# Patient Record
Sex: FEMALE | Race: WHITE | NOT HISPANIC OR LATINO | Employment: OTHER | URBAN - METROPOLITAN AREA
[De-identification: names, ages, dates, MRNs, and addresses within clinical notes are randomized per-mention and may not be internally consistent; named-entity substitution may affect disease eponyms.]

---

## 2017-03-28 ENCOUNTER — HOSPITAL ENCOUNTER (EMERGENCY)
Facility: HOSPITAL | Age: 42
Discharge: HOME | End: 2017-03-28
Attending: EMERGENCY MEDICINE | Admitting: EMERGENCY MEDICINE
Payer: MEDICARE

## 2017-03-28 VITALS
DIASTOLIC BLOOD PRESSURE: 99 MMHG | RESPIRATION RATE: 16 BRPM | OXYGEN SATURATION: 97 % | HEART RATE: 90 BPM | SYSTOLIC BLOOD PRESSURE: 168 MMHG | TEMPERATURE: 98.4 F

## 2017-03-28 DIAGNOSIS — G40.909 SEIZURE DISORDER (HCC): Primary | ICD-10-CM

## 2017-03-28 PROCEDURE — 99281 EMR DPT VST MAYX REQ PHY/QHP: CPT

## 2017-03-28 RX ORDER — ALPRAZOLAM 2 MG/1
2 TABLET ORAL
COMMUNITY
End: 2017-03-28

## 2017-03-28 RX ORDER — LAMOTRIGINE 5 MG/1
10 TABLET, CHEWABLE ORAL
COMMUNITY
End: 2017-03-28

## 2017-03-28 RX ORDER — BUPRENORPHINE AND NALOXONE 4; 1 MG/1; MG/1
FILM, SOLUBLE BUCCAL; SUBLINGUAL
COMMUNITY
End: 2017-03-28

## 2017-03-28 RX ORDER — LAMOTRIGINE 250 MG/1
1 TABLET, EXTENDED RELEASE ORAL DAILY
Qty: 30 TABLET | Refills: 0 | Status: SHIPPED | OUTPATIENT
Start: 2017-03-28 | End: 2017-03-28

## 2017-03-28 RX ORDER — LORAZEPAM 1 MG/1
1 TABLET ORAL DAILY
Qty: 10 TABLET | Refills: 0 | Status: SHIPPED | OUTPATIENT
Start: 2017-03-28 | End: 2017-04-07

## 2017-03-28 RX ORDER — CLONIDINE HYDROCHLORIDE 0.1 MG/1
0.1 TABLET ORAL
COMMUNITY
End: 2017-03-28

## 2017-03-28 RX ORDER — MELOXICAM 15 MG/1
15 TABLET ORAL
COMMUNITY
End: 2017-03-28

## 2017-03-28 RX ORDER — LAMOTRIGINE 200 MG/1
200 TABLET ORAL DAILY
Qty: 30 TABLET | Refills: 0 | Status: SHIPPED | OUTPATIENT
Start: 2017-03-28 | End: 2017-04-27

## 2017-03-28 RX ORDER — ASPIRIN 325 MG
650 TABLET ORAL
COMMUNITY
End: 2017-03-28

## 2017-03-28 RX ORDER — RISPERIDONE 0.25 MG/1
0.25 TABLET, FILM COATED ORAL
COMMUNITY
End: 2017-03-28

## 2017-03-28 RX ORDER — LAMOTRIGINE 25 MG/1
250 TABLET ORAL 2 TIMES DAILY
COMMUNITY
Start: 2015-05-29

## 2017-03-28 RX ORDER — DIPHENOXYLATE HYDROCHLORIDE AND ATROPINE SULFATE 2.5; .025 MG/1; MG/1
1 TABLET ORAL
COMMUNITY
End: 2017-03-28

## 2017-03-28 RX ORDER — ONDANSETRON 4 MG/1
4 TABLET, FILM COATED ORAL
COMMUNITY
End: 2017-03-28

## 2017-03-28 RX ORDER — LORAZEPAM 1 MG/1
1 TABLET ORAL EVERY 6 HOURS PRN
COMMUNITY

## 2017-03-28 RX ORDER — FLUOXETINE 10 MG/1
60 CAPSULE ORAL
COMMUNITY
End: 2017-03-28

## 2018-01-16 NOTE — PROGRESS NOTES
Assessment    1  Bipolar 1 disorder (296 7) (F31 9)   2  Rheumatoid arthritis (714 0) (M06 9)   3  Insomnia (780 52) (G47 00)    Plan  Insomnia    · TraZODone HCl - 50 MG Oral Tablet; TAKE 1 OR TWO TABLETS AT BEDTIME  Rheumatoid arthritis    · 3 - [facility] (ANESTHESIOLOGY ) Physician Referral  Consult  Status: Hold For -  Scheduling  Requested for: [id number]  are Referring to a non-SL Preferred Provider : Insurance Coverage  Care Summary provided  : Yes  Unlinked    · QUEtiapine Fumarate 100 MG Oral Tablet    Discussion/Summary  Discussion Summary:   Will refer to pain management for treatment of chronic pain  Pt informed that I do not feel comfortable refilling her narcotics due to recent overdose  Cannot use tramadol secondary to seizure disorder  I offered anti-inflammatories but pt refused at this time  Trazodone started for insomnia  Discontinue seroquel and continue with rexulti  RTO 2 weeks  Counseling Documentation With Imm: The patient was counseled regarding instructions for management, risk factor reductions, patient and family education, risks and benefits of treatment options, importance of compliance with treatment  Medication SE Review and Pt Understands Tx: Possible side effects of new medications were reviewed with the patient/guardian today  The treatment plan was reviewed with the patient/guardian  The patient/guardian understands and agrees with the treatment plan      Chief Complaint  Chief Complaint Free Text Note Form: Pt came into office today to discuss her meds  She feels the current regimen of meds are not working  Pt ran out of Percocet and she had a seizure on Saturday  Pt states she has rheumatoid arthritis for about twelve years and she is in a lot of pain  Pt denies going to ER because they will only prescribe her Ativan  History of Present Illness  HPI: Pt presents for follow up  She is here asking for Percocet  Pt has been out of them for 1 week   Pt did recently have a psychiatric hospitalization for taking an overdose of her boyfriends narcotic medications  Pt also has a seizure disorder and is currently on keppra and lamictal  She has been taking seroquel along with rexulti instead of replacing the seroquel with rexulti  Review of Systems  Complete-Female:   Constitutional: as noted in HPI  Eyes: No complaints of eye pain, no red eyes, no eyesight problems, no discharge, no dry eyes, no itching of eyes  ENT: no complaints of earache, no loss of hearing, no nose bleeds, no nasal discharge, no sore throat, no hoarseness  Cardiovascular: No complaints of slow heart rate, no fast heart rate, no chest pain, no palpitations, no leg claudication, no lower extremity edema  Respiratory: No complaints of shortness of breath, no wheezing, no cough, no SOB on exertion, no orthopnea, no PND  Gastrointestinal: No complaints of abdominal pain, no constipation, no nausea or vomiting, no diarrhea, no bloody stools  Genitourinary: No complaints of dysuria, no incontinence, no pelvic pain, no dysmenorrhea, no vaginal discharge or bleeding  Musculoskeletal: as noted in HPI  Integumentary: No complaints of skin rash or lesions, no itching, no skin wounds, no breast pain or lump  Neurological: No complaints of headache, no confusion, no convulsions, no numbness, no dizziness or fainting, no tingling, no limb weakness, no difficulty walking  Psychiatric: as noted in HPI  Endocrine: No complaints of proptosis, no hot flashes, no muscle weakness, no deepening of the voice, no feelings of weakness  ROS Reviewed:   ROS reviewed  Active Problems    1  Bipolar 1 disorder (296 7) (F31 9)   2  Encounter for screening for lipid disorder (G67 91) (Z13 220)   3  Encounter for vitamin deficiency screening (I77 99) (Z13 21)   4  Fatigue (780 79) (R53 83)   5  Generalized anxiety disorder (300 02) (F41 1)   6  Insomnia (780 52) (G47 00)   7   Low back pain with right-sided sciatica, unspecified back pain laterality (724 3) (M54 41)   8  Lump, breast (611 72) (N63)   9  Need for influenza vaccination (V04 81) (Z23)   10  Need for tetanus booster (V03 7) (Z23)   11  Rheumatoid arthritis (714 0) (M06 9)   12  Screening for depression (V79 0) (Z13 89)   13  Screening for diabetes mellitus (V77 1) (Z13 1)   14  Screening for iron deficiency anemia (V78 0) (Z13 0)   15  Seizures (780 39) (R56 9)    Past Medical History    1  History of Bipolar disorder (manic depression) (296 80) (F31 9)  Active Problems And Past Medical History Reviewed: The active problems and past medical history were reviewed and updated today  Surgical History    1  History of Umbilical Hernia Repair  Surgical History Reviewed: The surgical history was reviewed and updated today  Family History    1  Family history of myasthenia gravis (V17 89) (Z82 0)  Family History Reviewed: The family history was reviewed and updated today  Social History    · Current every day smoker (305 1) (F17 200)   · Never smoked any substance (V49 89) (Z78 9)   · Occasional alcohol use  Social History Reviewed: The social history was reviewed and updated today  The social history was reviewed and is unchanged  Current Meds   1  Keppra 500 MG Oral Tablet; take 2 tablet twice daily; Therapy: [id number] to Recorded   2  LamoTRIgine 100 MG Oral Tablet; take 2 tablet daily; Therapy: [id number] to Recorded   3  QUEtiapine Fumarate 100 MG Oral Tablet; Take 1 tablet daily Recorded   4  Rexulti 2 MG Oral Tablet; Take one tablet daily; Therapy: [id number] to (Last Rx:08Jan2016)  Requested for: 84GLR9299 Ordered  Medication List Reviewed: The medication list was reviewed and updated today  Allergies    1   Vicodin TABS    Vitals  Vital Signs [Data Includes: Current Encounter]    Recorded: [id number] 10:50AM   Temperature 97 9 F, Tympanic   Heart Rate 96   Respiration 20   Systolic 846, LUE, Sitting   Diastolic 90, PAOLOE, Sitting   Height 5 ft 4 in   Weight 175 lb    BMI Calculated 30 04   BSA Calculated 1 86     Physical Exam    Constitutional   General appearance: No acute distress, well appearing and well nourished  Eyes   Conjunctiva and lids: No swelling, erythema or discharge  Pupils and irises: Equal, round and reactive to light  Ears, Nose, Mouth, and Throat   External inspection of ears and nose: Normal     Otoscopic examination: Tympanic membranes translucent with normal light reflex  Canals patent without erythema  Nasal mucosa, septum, and turbinates: Normal without edema or erythema  Oropharynx: Normal with no erythema, edema, exudate or lesions  Pulmonary   Respiratory effort: No increased work of breathing or signs of respiratory distress  Auscultation of lungs: Clear to auscultation  Cardiovascular   Auscultation of heart: Normal rate and rhythm, normal S1 and S2, without murmurs  Examination of extremities for edema and/or varicosities: Normal     Carotid pulses: Normal     Abdomen   Abdomen: Non-tender, no masses  Liver and spleen: No hepatomegaly or splenomegaly  Lymphatic   Palpation of lymph nodes in neck: No lymphadenopathy  Musculoskeletal   Gait and station: Normal     Digits and nails: Normal without clubbing or cyanosis  Inspection/palpation of joints, bones, and muscles: Normal     Skin   Skin and subcutaneous tissue: Normal without rashes or lesions  Psychiatric   Orientation to person, place, and time: Normal     Mood and affect: Abnormal   Mood and Affect: anxious and labile          Future Appointments    Date/Time Provider Specialty Site   02/01/2016 09:30 AM Yared Garcia, [facility] INT MED     Signatures   Electronically signed by : Adilene Haider [facility]; Jan 18 2016  1:05PM EST                       (Author)    Electronically signed by : SUE Sow ; Jan 18 2016  1:31PM EST                       (Author)